# Patient Record
Sex: FEMALE | Race: WHITE | NOT HISPANIC OR LATINO | URBAN - METROPOLITAN AREA
[De-identification: names, ages, dates, MRNs, and addresses within clinical notes are randomized per-mention and may not be internally consistent; named-entity substitution may affect disease eponyms.]

---

## 2021-04-25 ENCOUNTER — EMERGENCY (EMERGENCY)
Facility: HOSPITAL | Age: 56
LOS: 1 days | Discharge: HOME | End: 2021-04-25
Attending: STUDENT IN AN ORGANIZED HEALTH CARE EDUCATION/TRAINING PROGRAM
Payer: COMMERCIAL

## 2021-04-25 VITALS
HEART RATE: 80 BPM | RESPIRATION RATE: 16 BRPM | TEMPERATURE: 98 F | SYSTOLIC BLOOD PRESSURE: 130 MMHG | OXYGEN SATURATION: 100 % | DIASTOLIC BLOOD PRESSURE: 79 MMHG

## 2021-04-25 VITALS
RESPIRATION RATE: 18 BRPM | HEIGHT: 65 IN | HEART RATE: 91 BPM | TEMPERATURE: 98 F | SYSTOLIC BLOOD PRESSURE: 115 MMHG | OXYGEN SATURATION: 97 % | DIASTOLIC BLOOD PRESSURE: 74 MMHG | WEIGHT: 139.99 LBS

## 2021-04-25 LAB
ALBUMIN SERPL ELPH-MCNC: 3.9 G/DL — SIGNIFICANT CHANGE UP (ref 3.3–5)
ALP SERPL-CCNC: 55 U/L — SIGNIFICANT CHANGE UP (ref 40–120)
ALT FLD-CCNC: 32 U/L — SIGNIFICANT CHANGE UP (ref 10–45)
ANION GAP SERPL CALC-SCNC: 14 MMOL/L — SIGNIFICANT CHANGE UP (ref 5–17)
AST SERPL-CCNC: 22 U/L — SIGNIFICANT CHANGE UP (ref 10–40)
BASOPHILS # BLD AUTO: 0.05 K/UL — SIGNIFICANT CHANGE UP (ref 0–0.2)
BASOPHILS NFR BLD AUTO: 0.8 % — SIGNIFICANT CHANGE UP (ref 0–2)
BILIRUB SERPL-MCNC: 0.4 MG/DL — SIGNIFICANT CHANGE UP (ref 0.2–1.2)
BUN SERPL-MCNC: 14 MG/DL — SIGNIFICANT CHANGE UP (ref 7–23)
CALCIUM SERPL-MCNC: 9.3 MG/DL — SIGNIFICANT CHANGE UP (ref 8.4–10.5)
CHLORIDE SERPL-SCNC: 107 MMOL/L — SIGNIFICANT CHANGE UP (ref 96–108)
CO2 SERPL-SCNC: 21 MMOL/L — LOW (ref 22–31)
CREAT SERPL-MCNC: 0.52 MG/DL — SIGNIFICANT CHANGE UP (ref 0.5–1.3)
D DIMER BLD IA.RAPID-MCNC: 161 NG/ML DDU — SIGNIFICANT CHANGE UP
EOSINOPHIL # BLD AUTO: 0.08 K/UL — SIGNIFICANT CHANGE UP (ref 0–0.5)
EOSINOPHIL NFR BLD AUTO: 1.2 % — SIGNIFICANT CHANGE UP (ref 0–6)
GLUCOSE SERPL-MCNC: 94 MG/DL — SIGNIFICANT CHANGE UP (ref 70–99)
HCT VFR BLD CALC: 42.4 % — SIGNIFICANT CHANGE UP (ref 34.5–45)
HGB BLD-MCNC: 14.4 G/DL — SIGNIFICANT CHANGE UP (ref 11.5–15.5)
IMM GRANULOCYTES NFR BLD AUTO: 0.5 % — SIGNIFICANT CHANGE UP (ref 0–1.5)
LYMPHOCYTES # BLD AUTO: 1.67 K/UL — SIGNIFICANT CHANGE UP (ref 1–3.3)
LYMPHOCYTES # BLD AUTO: 25.7 % — SIGNIFICANT CHANGE UP (ref 13–44)
MCHC RBC-ENTMCNC: 30.4 PG — SIGNIFICANT CHANGE UP (ref 27–34)
MCHC RBC-ENTMCNC: 34 GM/DL — SIGNIFICANT CHANGE UP (ref 32–36)
MCV RBC AUTO: 89.5 FL — SIGNIFICANT CHANGE UP (ref 80–100)
MONOCYTES # BLD AUTO: 0.68 K/UL — SIGNIFICANT CHANGE UP (ref 0–0.9)
MONOCYTES NFR BLD AUTO: 10.4 % — SIGNIFICANT CHANGE UP (ref 2–14)
NEUTROPHILS # BLD AUTO: 4 K/UL — SIGNIFICANT CHANGE UP (ref 1.8–7.4)
NEUTROPHILS NFR BLD AUTO: 61.4 % — SIGNIFICANT CHANGE UP (ref 43–77)
NRBC # BLD: 0 /100 WBCS — SIGNIFICANT CHANGE UP (ref 0–0)
NT-PROBNP SERPL-SCNC: 60 PG/ML — SIGNIFICANT CHANGE UP (ref 0–300)
PLATELET # BLD AUTO: 199 K/UL — SIGNIFICANT CHANGE UP (ref 150–400)
POTASSIUM SERPL-MCNC: 3.9 MMOL/L — SIGNIFICANT CHANGE UP (ref 3.5–5.3)
POTASSIUM SERPL-SCNC: 3.9 MMOL/L — SIGNIFICANT CHANGE UP (ref 3.5–5.3)
PROT SERPL-MCNC: 7 G/DL — SIGNIFICANT CHANGE UP (ref 6–8.3)
RBC # BLD: 4.74 M/UL — SIGNIFICANT CHANGE UP (ref 3.8–5.2)
RBC # FLD: 12.1 % — SIGNIFICANT CHANGE UP (ref 10.3–14.5)
SARS-COV-2 RNA SPEC QL NAA+PROBE: SIGNIFICANT CHANGE UP
SODIUM SERPL-SCNC: 142 MMOL/L — SIGNIFICANT CHANGE UP (ref 135–145)
TROPONIN T, HIGH SENSITIVITY RESULT: <6 NG/L — SIGNIFICANT CHANGE UP (ref 0–51)
WBC # BLD: 6.51 K/UL — SIGNIFICANT CHANGE UP (ref 3.8–10.5)
WBC # FLD AUTO: 6.51 K/UL — SIGNIFICANT CHANGE UP (ref 3.8–10.5)

## 2021-04-25 PROCEDURE — U0003: CPT

## 2021-04-25 PROCEDURE — 99284 EMERGENCY DEPT VISIT MOD MDM: CPT | Mod: 25

## 2021-04-25 PROCEDURE — 85025 COMPLETE CBC W/AUTO DIFF WBC: CPT

## 2021-04-25 PROCEDURE — 71045 X-RAY EXAM CHEST 1 VIEW: CPT | Mod: 26

## 2021-04-25 PROCEDURE — 85379 FIBRIN DEGRADATION QUANT: CPT

## 2021-04-25 PROCEDURE — 93010 ELECTROCARDIOGRAM REPORT: CPT | Mod: NC

## 2021-04-25 PROCEDURE — 93005 ELECTROCARDIOGRAM TRACING: CPT

## 2021-04-25 PROCEDURE — 99284 EMERGENCY DEPT VISIT MOD MDM: CPT

## 2021-04-25 PROCEDURE — 80053 COMPREHEN METABOLIC PANEL: CPT

## 2021-04-25 PROCEDURE — U0005: CPT

## 2021-04-25 PROCEDURE — 71045 X-RAY EXAM CHEST 1 VIEW: CPT

## 2021-04-25 PROCEDURE — 84484 ASSAY OF TROPONIN QUANT: CPT

## 2021-04-25 PROCEDURE — 83880 ASSAY OF NATRIURETIC PEPTIDE: CPT

## 2021-04-25 PROCEDURE — 96374 THER/PROPH/DIAG INJ IV PUSH: CPT

## 2021-04-25 RX ORDER — ACETAMINOPHEN 500 MG
975 TABLET ORAL ONCE
Refills: 0 | Status: COMPLETED | OUTPATIENT
Start: 2021-04-25 | End: 2021-04-25

## 2021-04-25 RX ORDER — METOCLOPRAMIDE HCL 10 MG
10 TABLET ORAL ONCE
Refills: 0 | Status: COMPLETED | OUTPATIENT
Start: 2021-04-25 | End: 2021-04-25

## 2021-04-25 RX ORDER — SODIUM CHLORIDE 9 MG/ML
1000 INJECTION INTRAMUSCULAR; INTRAVENOUS; SUBCUTANEOUS ONCE
Refills: 0 | Status: COMPLETED | OUTPATIENT
Start: 2021-04-25 | End: 2021-04-25

## 2021-04-25 RX ADMIN — Medication 10 MILLIGRAM(S): at 12:51

## 2021-04-25 RX ADMIN — SODIUM CHLORIDE 1000 MILLILITER(S): 9 INJECTION INTRAMUSCULAR; INTRAVENOUS; SUBCUTANEOUS at 12:51

## 2021-04-25 RX ADMIN — Medication 975 MILLIGRAM(S): at 12:51

## 2021-04-25 NOTE — ED PROVIDER NOTE - PATIENT PORTAL LINK FT
You can access the FollowMyHealth Patient Portal offered by [facility] by registering at the following website: [email or web address]. By joining [facility]’s FollowMyHealth portal, you will also be able to view your health information using other applications (apps) compatible with our system.

## 2021-04-25 NOTE — ED ADULT NURSE NOTE - OBJECTIVE STATEMENT
55 year old female presents to the ED complaining of pressure headache since receiving her 2nd dose of the pizer covid vaccine on Thursday. Pt has some photophobia and is otherwise neurologically intact, Pt states that "it feels like I'm waking on a cruise ship" pt is ambulatory and steady on her feet while in ED> Pt is A&O x 4, VSS, afebrile, ambulating independently. Pt denies fever, chills, NVD, SOB, or chest pain. IV placed, labs drawn, bed in low position, safety measures in place. Pt has call bell within reach., 20G IV placed in right lateral forearm.

## 2021-04-25 NOTE — ED PROVIDER NOTE - NSFOLLOWUPINSTRUCTIONS_ED_ALL_ED_FT
Hydrate.    Please see the information of COVID vaccine and headache.    Keep continue Tylenol for pain as package directed and respect warnings on the label.    Follow up with your primary Dr. for reevaluation, call Monday for an appointment in 2-3days.    Return for any concerns or worsening symptoms.

## 2021-04-25 NOTE — ED PROVIDER NOTE - PHYSICAL EXAMINATION
NAD, VSS, Afebrile, + PERRL, EOMI, No sinus tender. No spinal tender. Lungs clear. ABD soft, non tender. No CVA tender. No peripheral edema or calf tender. Neuro- intact.

## 2021-04-25 NOTE — ED PROVIDER NOTE - OBJECTIVE STATEMENT
54yo female pt, no PMHx, non smoker, presents to ED with headache/dizziness, nausea, neck pain, SOB and left calf pain. Pt stated she had 2nd Pfizer COVID vaccine on 4/21/2021 and all symptoms started next day, 4/22/2021. Reported her head is pressure pain on posterior area with neck pain with dizziness and now the pain radiating to front. She's also had intermittent nausea, SOB, mild cough and left calf pain. She took 2 tablets of Tylenol with some relief but noticed worsening headache this morning. Denies injury. Denies previous headache. Denies fever, chills, sore throat, or congestion. Denies visual changes. Denies vomiting. Denies CP/back pain or ABD pain. Denies diarrhea. Denies urinary problems. Denies sensory changes or weakness to extremities. 54yo female pt, no PMHx, non smoker, presents to ED with headache/dizziness, nausea, neck pain, SOB and left calf pain. Pt stated she had 2nd Pfizer COVID vaccine on 4/21/2021 and all symptoms started next day, 4/22/2021. Reported her head is pressure pain on posterior area with neck pain with dizziness and now the pain radiating to front. She's also had intermittent nausea, SOB, mild cough and left calf pain and fever (101) on Thursday and Friday. She took 2 tablets of Tylenol with some relief but noticed worsening headache this morning. Denies injury. Denies previous headache. Denies sore throat, or congestion. Denies visual changes. Denies vomiting. Denies CP/back pain or ABD pain. Denies diarrhea. Denies urinary problems. Denies sensory changes or weakness to extremities.

## 2021-04-25 NOTE — ED ADULT NURSE NOTE - NS_ED_NURSE_TEACHING_TOPIC_ED_A_ED
Pt discharged, VSS, afebrile, ambulating independently. Nurse clearly reviewed discharge instructions, followup care, and when to return to the ED - Pt verbalized understanding./Neurovascular

## 2021-04-25 NOTE — ED PROVIDER NOTE - CLINICAL SUMMARY MEDICAL DECISION MAKING FREE TEXT BOX
Dr. Zulema Perez: 55 year old female with no significant past medial history presented to ED with headache, fever, and diffuse myalgia after 2nd Pfizer vaccine. No N/V. No neurological deficits on exam. No nuchal rigidity. Symptoms likely related to recent COVID vaccine. Plan: Labs, analgesia PRN. Reasess

## 2023-06-30 ENCOUNTER — EMERGENCY (EMERGENCY)
Facility: HOSPITAL | Age: 58
LOS: 1 days | Discharge: HOME | End: 2023-06-30
Attending: EMERGENCY MEDICINE | Admitting: EMERGENCY MEDICINE
Payer: COMMERCIAL

## 2023-06-30 VITALS
HEART RATE: 85 BPM | RESPIRATION RATE: 18 BRPM | OXYGEN SATURATION: 98 % | TEMPERATURE: 98 F | DIASTOLIC BLOOD PRESSURE: 84 MMHG | SYSTOLIC BLOOD PRESSURE: 117 MMHG

## 2023-06-30 VITALS
TEMPERATURE: 98 F | DIASTOLIC BLOOD PRESSURE: 80 MMHG | OXYGEN SATURATION: 97 % | RESPIRATION RATE: 18 BRPM | SYSTOLIC BLOOD PRESSURE: 122 MMHG | WEIGHT: 154.1 LBS | HEART RATE: 93 BPM | HEIGHT: 65 IN

## 2023-06-30 PROBLEM — Z00.00 ENCOUNTER FOR PREVENTIVE HEALTH EXAMINATION: Status: ACTIVE | Noted: 2023-06-30

## 2023-06-30 LAB
ALBUMIN SERPL ELPH-MCNC: 3.6 G/DL — SIGNIFICANT CHANGE UP (ref 3.3–5)
ALP SERPL-CCNC: 57 U/L — SIGNIFICANT CHANGE UP (ref 40–120)
ALT FLD-CCNC: 31 U/L — SIGNIFICANT CHANGE UP (ref 12–78)
ANION GAP SERPL CALC-SCNC: 6 MMOL/L — SIGNIFICANT CHANGE UP (ref 5–17)
APPEARANCE UR: ABNORMAL
AST SERPL-CCNC: 16 U/L — SIGNIFICANT CHANGE UP (ref 15–37)
BASOPHILS # BLD AUTO: 0.05 K/UL — SIGNIFICANT CHANGE UP (ref 0–0.2)
BASOPHILS NFR BLD AUTO: 0.5 % — SIGNIFICANT CHANGE UP (ref 0–2)
BILIRUB SERPL-MCNC: 0.6 MG/DL — SIGNIFICANT CHANGE UP (ref 0.2–1.2)
BILIRUB UR-MCNC: NEGATIVE — SIGNIFICANT CHANGE UP
BUN SERPL-MCNC: 12 MG/DL — SIGNIFICANT CHANGE UP (ref 7–23)
CALCIUM SERPL-MCNC: 9.4 MG/DL — SIGNIFICANT CHANGE UP (ref 8.5–10.1)
CHLORIDE SERPL-SCNC: 111 MMOL/L — HIGH (ref 96–108)
CO2 SERPL-SCNC: 26 MMOL/L — SIGNIFICANT CHANGE UP (ref 22–31)
COLOR SPEC: YELLOW — SIGNIFICANT CHANGE UP
CREAT SERPL-MCNC: 0.56 MG/DL — SIGNIFICANT CHANGE UP (ref 0.5–1.3)
DIFF PNL FLD: ABNORMAL
EGFR: 105 ML/MIN/1.73M2 — SIGNIFICANT CHANGE UP
EOSINOPHIL # BLD AUTO: 0.06 K/UL — SIGNIFICANT CHANGE UP (ref 0–0.5)
EOSINOPHIL NFR BLD AUTO: 0.6 % — SIGNIFICANT CHANGE UP (ref 0–6)
GLUCOSE SERPL-MCNC: 100 MG/DL — HIGH (ref 70–99)
GLUCOSE UR QL: NEGATIVE MG/DL — SIGNIFICANT CHANGE UP
HCT VFR BLD CALC: 42.2 % — SIGNIFICANT CHANGE UP (ref 34.5–45)
HGB BLD-MCNC: 14.4 G/DL — SIGNIFICANT CHANGE UP (ref 11.5–15.5)
IMM GRANULOCYTES NFR BLD AUTO: 0.5 % — SIGNIFICANT CHANGE UP (ref 0–0.9)
KETONES UR-MCNC: NEGATIVE MG/DL — SIGNIFICANT CHANGE UP
LEUKOCYTE ESTERASE UR-ACNC: ABNORMAL
LYMPHOCYTES # BLD AUTO: 1.64 K/UL — SIGNIFICANT CHANGE UP (ref 1–3.3)
LYMPHOCYTES # BLD AUTO: 15.8 % — SIGNIFICANT CHANGE UP (ref 13–44)
MCHC RBC-ENTMCNC: 31 PG — SIGNIFICANT CHANGE UP (ref 27–34)
MCHC RBC-ENTMCNC: 34.1 GM/DL — SIGNIFICANT CHANGE UP (ref 32–36)
MCV RBC AUTO: 90.8 FL — SIGNIFICANT CHANGE UP (ref 80–100)
MONOCYTES # BLD AUTO: 0.74 K/UL — SIGNIFICANT CHANGE UP (ref 0–0.9)
MONOCYTES NFR BLD AUTO: 7.1 % — SIGNIFICANT CHANGE UP (ref 2–14)
NEUTROPHILS # BLD AUTO: 7.83 K/UL — HIGH (ref 1.8–7.4)
NEUTROPHILS NFR BLD AUTO: 75.5 % — SIGNIFICANT CHANGE UP (ref 43–77)
NITRITE UR-MCNC: POSITIVE
NRBC # BLD: 0 /100 WBCS — SIGNIFICANT CHANGE UP (ref 0–0)
PH UR: 6 — SIGNIFICANT CHANGE UP (ref 5–8)
PLATELET # BLD AUTO: 222 K/UL — SIGNIFICANT CHANGE UP (ref 150–400)
POTASSIUM SERPL-MCNC: 3.9 MMOL/L — SIGNIFICANT CHANGE UP (ref 3.5–5.3)
POTASSIUM SERPL-SCNC: 3.9 MMOL/L — SIGNIFICANT CHANGE UP (ref 3.5–5.3)
PROT SERPL-MCNC: 7.2 G/DL — SIGNIFICANT CHANGE UP (ref 6–8.3)
PROT UR-MCNC: 30 MG/DL
RBC # BLD: 4.65 M/UL — SIGNIFICANT CHANGE UP (ref 3.8–5.2)
RBC # FLD: 12.5 % — SIGNIFICANT CHANGE UP (ref 10.3–14.5)
SODIUM SERPL-SCNC: 143 MMOL/L — SIGNIFICANT CHANGE UP (ref 135–145)
SP GR SPEC: 1.01 — SIGNIFICANT CHANGE UP (ref 1–1.03)
UROBILINOGEN FLD QL: 1 MG/DL — SIGNIFICANT CHANGE UP (ref 0.2–1)
WBC # BLD: 10.37 K/UL — SIGNIFICANT CHANGE UP (ref 3.8–10.5)
WBC # FLD AUTO: 10.37 K/UL — SIGNIFICANT CHANGE UP (ref 3.8–10.5)

## 2023-06-30 PROCEDURE — 85025 COMPLETE CBC W/AUTO DIFF WBC: CPT

## 2023-06-30 PROCEDURE — 99284 EMERGENCY DEPT VISIT MOD MDM: CPT

## 2023-06-30 PROCEDURE — 36415 COLL VENOUS BLD VENIPUNCTURE: CPT

## 2023-06-30 PROCEDURE — 99284 EMERGENCY DEPT VISIT MOD MDM: CPT | Mod: 25

## 2023-06-30 PROCEDURE — 74176 CT ABD & PELVIS W/O CONTRAST: CPT | Mod: MA

## 2023-06-30 PROCEDURE — 87086 URINE CULTURE/COLONY COUNT: CPT

## 2023-06-30 PROCEDURE — 74176 CT ABD & PELVIS W/O CONTRAST: CPT | Mod: 26,MA

## 2023-06-30 PROCEDURE — 87186 SC STD MICRODIL/AGAR DIL: CPT

## 2023-06-30 PROCEDURE — 80053 COMPREHEN METABOLIC PANEL: CPT

## 2023-06-30 PROCEDURE — 81001 URINALYSIS AUTO W/SCOPE: CPT

## 2023-06-30 PROCEDURE — 96374 THER/PROPH/DIAG INJ IV PUSH: CPT

## 2023-06-30 RX ORDER — CEFUROXIME AXETIL 250 MG
1 TABLET ORAL
Qty: 14 | Refills: 0
Start: 2023-06-30 | End: 2023-07-06

## 2023-06-30 RX ORDER — ACETAMINOPHEN 500 MG
650 TABLET ORAL ONCE
Refills: 0 | Status: COMPLETED | OUTPATIENT
Start: 2023-06-30 | End: 2023-06-30

## 2023-06-30 RX ORDER — CEFTRIAXONE 500 MG/1
1000 INJECTION, POWDER, FOR SOLUTION INTRAMUSCULAR; INTRAVENOUS ONCE
Refills: 0 | Status: COMPLETED | OUTPATIENT
Start: 2023-06-30 | End: 2023-06-30

## 2023-06-30 RX ORDER — PHENAZOPYRIDINE HCL 100 MG
100 TABLET ORAL ONCE
Refills: 0 | Status: COMPLETED | OUTPATIENT
Start: 2023-06-30 | End: 2023-06-30

## 2023-06-30 RX ORDER — PHENAZOPYRIDINE HCL 100 MG
2 TABLET ORAL
Qty: 12 | Refills: 0
Start: 2023-06-30 | End: 2023-07-01

## 2023-06-30 RX ADMIN — Medication 650 MILLIGRAM(S): at 15:49

## 2023-06-30 RX ADMIN — Medication 100 MILLIGRAM(S): at 15:50

## 2023-06-30 RX ADMIN — CEFTRIAXONE 100 MILLIGRAM(S): 500 INJECTION, POWDER, FOR SOLUTION INTRAMUSCULAR; INTRAVENOUS at 15:50

## 2023-06-30 NOTE — ED PROVIDER NOTE - CLINICAL SUMMARY MEDICAL DECISION MAKING FREE TEXT BOX
Patient referred by her gynecologist for hematuria today.  Patient relates when she woke up this morning, blood in her underwear, and when she went to urinate she noticed afterwards that the bowl was bloody.  Patient denies rectal bleeding.  Patient went to her gynecologist who did not a speculum exam and said that the bleeding was not from her vagina, was urinary.  Patient relates chronic low back pain across her lower back, unchanged at this time.  Patient denies fevers chills abdominal pain flank pain nausea vomiting.  Patient does report some dysuria.  Patient declining pain medication    Plan Labs CT [place] IV fluid    Differential including but not limited to kidney stone UTI

## 2023-06-30 NOTE — ED PROVIDER NOTE - PATIENT PORTAL LINK FT
You can access the FollowMyHealth Patient Portal offered by [facility] by registering at the following website: [email or web address]. By joining [facility]’s FollowMyHealth portal, you will also be able to view your health information using other applications (apps) compatible with our system.

## 2023-06-30 NOTE — ED ADULT NURSE NOTE - OBJECTIVE STATEMENT
pt is A&Ox4, pt presented to the ER from home  for vaginal bleed, denies any dysuria, hematuria+, denies any clots. Iv placed, meds given as per Md orders, resp even and unlabored, will continue to monitor

## 2023-06-30 NOTE — ED ADULT NURSE NOTE - NSFALLUNIVINTERV_ED_ALL_ED
Bed/Stretcher in lowest position, wheels locked, appropriate side rails in place/Call bell, personal items and telephone in reach/Instruct patient to call for assistance before getting out of bed/chair/stretcher/Non-slip footwear applied when patient is off stretcher/[place] to call system/Physically safe environment - no spills, clutter or unnecessary equipment/Purposeful proactive rounding/Room/bathroom lighting operational, light cord in reach

## 2023-06-30 NOTE — ED PROVIDER NOTE - NSFOLLOWUPINSTRUCTIONS_ED_ALL_ED_FT
Follow up with pcp  drink plenty of water  return to er for any worsening symptoms     Hemorrhagic Cystitis  Hemorrhagic cystitis is bleeding from damage to the inner lining of the bladder. This condition results when the inner lining of the bladder (transitional epithelium) is damaged along with the blood vessels that supply the area. Hemorrhagic cystitis may make it difficult or painful to pass urine.    What are the causes?  This condition may be caused by:  Damage from certain cancer treatments. This is the most common cause. It can result from:  Radiation treatment that involves the bladder.  Chemotherapy drugs used to treat certain cancers or to treat people who have a bone marrow transplant (cyclophosphamide and ifosfamide).  Infections with bacteria or viruses, especially in people with a weak body defense system (immune system).  Rare causes of the condition include:  Other drugs, including penicillin drugs and a type of steroid (danazol).  Exposure to toxic chemicals used in dyes, markers, shoe polish, or pesticides.  What are the signs or symptoms?  The main sign of this condition is blood in the urine (hematuria). This can range from very mild to severe.  Mild hematuria can include microscopic bleeding that does not change the color of your urine.  Severe hematuria can cause you to have urine with bright red blood or blood clots in it. In some cases, severe hematuria can cause large clots that fill the bladder and block urine flow (urinary obstruction).  Hemorrhagic cystitis may also cause symptoms such as:  An urgent or frequent need to pass urine.  Pain when passing urine.  Lower belly pain and fullness.  Urinary obstruction.  How is this diagnosed?  This condition may be diagnosed based on:  Your symptoms and medical history.  A physical exam.  Tests, such as:  Urine tests to check for blood or signs of infection.  Blood tests to check for signs of infection and a low red blood cell count due to bleeding.  Imaging tests of the bladder, such as ultrasound, CT scan, or MRI.  A procedure to examine the inside of your bladder using a flexible scope (cystoscopy).  How is this treated?  Treatment depends on the cause of the condition and how severe the bleeding is. If you are being treated with chemotherapy drugs, you may be given other medicines to reduce the risk for this condition during treatment. Other treatments may include:  Removing your exposure to substances that are causing the condition, such as a chemical toxin or a medicine.  Taking antibiotic or antiviral medicine if the condition is caused by an infection.  You may also be treated for hematuria. This can include:  Fluids (hydration), bed rest, and observation. These methods may be all that is needed if you are able to pass urine and have no blood clots.  Placing a flexible tube (catheter) into the bladder to continuously flush out (irrigate) the bladder with sterile saline solution. This may be needed if clots are passing or if bleeding is continuing.  Medicine to reduce bleeding.  A cystoscopy to remove clots if clots are filling the bladder. This may also include a procedure to stop bleeding (coagulation).  A transfusion to replace blood loss.  You may need surgery to stop bleeding or to remove the bladder if other treatments have not helped.    Follow these instructions at home:  A comparison of three sample cups showing dark yellow, yellow, and pale yellow urine.  If you had surgery, your health care provider will give you instructions for taking care of yourself at home after your procedure. Follow these instructions carefully.  Take over-the-counter and prescription medicines only as told by your health care provider.  If you were prescribed an antibiotic medicine, take it as told by your health care provider. Do not stop using the antibiotic even if you start to feel better.  Return to your normal activities as told by your health care provider. Ask your health care provider what activities are safe for you.  Drink enough fluid to keep your urine pale yellow.  Keep all follow-up visits as told by your health care provider. This is important.  Contact a health care provider if you have:  Chills or a fever.  Blood in your urine.  An urgent or frequent need to pass urine.  Pain when passing urine.  Get help right away if you:  Have bright red blood or clots in your urine.  Are unable to pass urine.  Summary  Hemorrhagic cystitis is bleeding caused by damage to the inner lining of your bladder.  Hemorrhagic cystitis may make it difficult or painful to pass urine.  This condition may be caused by damage from infections, radiation therapy, or chemotherapy drugs.  Blood in the urine (hematuria) is the main sign of hemorrhagic cystitis. Hematuria can be very mild and involve microscopic bleeding that does not change the color of urine. It can also be severe and include passing urine with bright red blood or blood clots in it.  Treatment for hemorrhagic cystitis depends on the cause and severity of the condition. In most cases, the condition will clear up with supportive care that may include rest, fluids, and antibiotics, along with removing exposure to the cause. Other treatments may be needed in more serious cases.  This information is not intended to replace advice given to you by your health care provider. Make sure you discuss any questions you have with your health care provider.

## 2023-06-30 NOTE — ED PROVIDER NOTE - NS ED ATTENDING STATEMENT MOD
This was a shared visit with the CARLOS EDUARDO. I reviewed and verified the documentation and independently performed the documented:

## 2023-06-30 NOTE — ED PROVIDER NOTE - OBJECTIVE STATEMENT
Patient is a 57-year-old female with past medical history of UroLift and partial hysterectomy coming in for hematuria.  Patient states she woke up today noticed blood in her underwear and then noticed it when she went to urinate.  Patient went to her GYN had pelvic exam advised not GYN related advised to come and go to emergency room for further checkup.  Patient does admit to some increased frequency and urgency denies any fever chills abdominal pain flank pain nausea vomiting diarrhea.  Patient is on aspirin daily.  Patient with has chronic low back pain across her lower back.

## 2023-06-30 NOTE — ED ADULT TRIAGE NOTE - CHIEF COMPLAINT QUOTE
Patient walked in with c/o vaginal bleeding since this morning, referred to ER by OBGYN to see a urologist. Patient reports she sees urologist for bladder lift last year but they were closed today.

## 2023-06-30 NOTE — ED ADULT NURSE NOTE - NSFALLLASTSIX_ED_ALL_ED
Refill requested:   Requested Prescriptions     Pending Prescriptions Disp Refills   • SYMBICORT 160-4.5 MCG/ACT Inhalation Aerosol [Pharmacy Med Name: Symbicort 160-4.5 Mcg/Act Aer Astr]  0     Sig: INHALE TWO PUFFS BY MOUTH TWICE DAILY   • Ondansetron HC GI)    Please arrive 15 minutes prior to your scheduled appointment time.      May 14, 2019  3:45 PM CDT FOLLOW UP with Marilee Figueroa  [facility] ([facility])        [address] [place] [address] Unable to determine.

## 2023-07-03 ENCOUNTER — TRANSCRIPTION ENCOUNTER (OUTPATIENT)
Age: 58
End: 2023-07-03

## 2023-07-03 ENCOUNTER — APPOINTMENT (OUTPATIENT)
Dept: PULMONOLOGY | Facility: CLINIC | Age: 58
End: 2023-07-03
Payer: COMMERCIAL

## 2023-07-03 VITALS
RESPIRATION RATE: 20 BRPM | DIASTOLIC BLOOD PRESSURE: 69 MMHG | HEIGHT: 64 IN | WEIGHT: 154 LBS | HEART RATE: 90 BPM | SYSTOLIC BLOOD PRESSURE: 116 MMHG | OXYGEN SATURATION: 97 % | BODY MASS INDEX: 26.29 KG/M2

## 2023-07-03 DIAGNOSIS — R09.02 HYPOXEMIA: ICD-10-CM

## 2023-07-03 DIAGNOSIS — Z78.9 OTHER SPECIFIED HEALTH STATUS: ICD-10-CM

## 2023-07-03 DIAGNOSIS — R05.9 COUGH, UNSPECIFIED: ICD-10-CM

## 2023-07-03 DIAGNOSIS — Z86.39 PERSONAL HISTORY OF OTHER ENDOCRINE, NUTRITIONAL AND METABOLIC DISEASE: ICD-10-CM

## 2023-07-03 DIAGNOSIS — Z80.8 FAMILY HISTORY OF MALIGNANT NEOPLASM OF OTHER ORGANS OR SYSTEMS: ICD-10-CM

## 2023-07-03 DIAGNOSIS — Z82.5 FAMILY HISTORY OF ASTHMA AND OTHER CHRONIC LOWER RESPIRATORY DISEASES: ICD-10-CM

## 2023-07-03 DIAGNOSIS — Z83.3 FAMILY HISTORY OF DIABETES MELLITUS: ICD-10-CM

## 2023-07-03 DIAGNOSIS — U07.1 COVID-19: ICD-10-CM

## 2023-07-03 DIAGNOSIS — R06.09 OTHER FORMS OF DYSPNEA: ICD-10-CM

## 2023-07-03 DIAGNOSIS — Z82.49 FAMILY HISTORY OF ISCHEMIC HEART DISEASE AND OTHER DISEASES OF THE CIRCULATORY SYSTEM: ICD-10-CM

## 2023-07-03 PROCEDURE — 99203 OFFICE O/P NEW LOW 30 MIN: CPT

## 2023-07-03 RX ORDER — ATORVASTATIN CALCIUM 20 MG/1
20 TABLET, FILM COATED ORAL
Qty: 90 | Refills: 0 | Status: COMPLETED | COMMUNITY
Start: 2023-04-20

## 2023-07-03 RX ORDER — ASPIRIN 81 MG/1
81 TABLET, COATED ORAL
Qty: 90 | Refills: 0 | Status: ACTIVE | COMMUNITY
Start: 2023-04-20

## 2023-07-03 RX ORDER — ALBUTEROL SULFATE 90 UG/1
108 (90 BASE) INHALANT RESPIRATORY (INHALATION)
Qty: 8 | Refills: 0 | Status: ACTIVE | COMMUNITY
Start: 2023-06-28

## 2023-07-03 RX ORDER — ATORVASTATIN CALCIUM 40 MG/1
40 TABLET, FILM COATED ORAL
Qty: 90 | Refills: 0 | Status: ACTIVE | COMMUNITY
Start: 2023-06-15

## 2023-07-03 RX ORDER — PHENAZOPYRIDINE HYDROCHLORIDE 100 MG/1
100 TABLET ORAL
Qty: 12 | Refills: 0 | Status: ACTIVE | COMMUNITY
Start: 2023-06-30

## 2023-07-03 RX ORDER — CEFUROXIME AXETIL 500 MG/1
500 TABLET ORAL
Qty: 14 | Refills: 0 | Status: COMPLETED | COMMUNITY
Start: 2023-06-30

## 2023-07-03 RX ORDER — CIPROFLOXACIN HYDROCHLORIDE 500 MG/1
500 TABLET, FILM COATED ORAL
Qty: 10 | Refills: 0 | Status: ACTIVE | COMMUNITY
Start: 2023-06-30

## 2023-07-03 NOTE — DISCUSSION/SUMMARY
[FreeTextEntry1] : 57-year-old female with history of cough and dyspnea on exertion.  Patient ambulated in the office and revealed significant oxygen desaturation after 100 meters.  (96% to 90%).  PFT with diffusion will be performed as well as a high-resolution CAT scan of the chest to rule out interstitial lung disease.  Had a very long discussion with the patient and her daughter who was present.  Both appear to understand.  She is to follow-up with her PMD as before.

## 2023-07-03 NOTE — HISTORY OF PRESENT ILLNESS
[Never] : never [TextBox_4] : 57-year-old female presents for evaluation of dyspnea on exertion associated with primarily dry cough for 6 months.  Patient states that the problem occurred after COVID-19 infection for which she was treated at home symptomatically.  The patient complains of occasional joint pains and morning stiffness but denies any fever, chills, chest pain, hemoptysis, rash or weight loss.  She had cardiac catheterization a month ago and told everything was "okay".  Subsequently she was seen by a pulmonologist who told her that there was "restriction" on breathing test and recommended a CAT scan of the chest. [TextBox_29] : Denies snoring, daytime somnolence, apneic episodes, AM headaches

## 2023-07-05 ENCOUNTER — APPOINTMENT (OUTPATIENT)
Dept: PULMONOLOGY | Facility: CLINIC | Age: 58
End: 2023-07-05
Payer: COMMERCIAL

## 2023-07-05 PROCEDURE — 94060 EVALUATION OF WHEEZING: CPT

## 2023-07-05 PROCEDURE — 94727 GAS DIL/WSHOT DETER LNG VOL: CPT

## 2023-07-05 PROCEDURE — 94729 DIFFUSING CAPACITY: CPT

## 2023-07-07 RX ORDER — NITROFURANTOIN MACROCRYSTAL 50 MG
1 CAPSULE ORAL
Qty: 14 | Refills: 0
Start: 2023-07-07 | End: 2023-07-13

## 2023-08-21 ENCOUNTER — APPOINTMENT (OUTPATIENT)
Dept: PULMONOLOGY | Facility: CLINIC | Age: 58
End: 2023-08-21
Payer: COMMERCIAL

## 2023-08-21 VITALS
OXYGEN SATURATION: 96 % | WEIGHT: 154 LBS | DIASTOLIC BLOOD PRESSURE: 70 MMHG | HEART RATE: 90 BPM | RESPIRATION RATE: 14 BRPM | HEIGHT: 64 IN | TEMPERATURE: 98 F | SYSTOLIC BLOOD PRESSURE: 110 MMHG | BODY MASS INDEX: 26.29 KG/M2

## 2023-08-21 PROCEDURE — 99214 OFFICE O/P EST MOD 30 MIN: CPT

## 2023-08-21 NOTE — CONSULT LETTER
[Dear  ___] : Dear  [unfilled], [Courtesy Letter:] : I had the pleasure of seeing your patient, [unfilled], in my office today. [Please see my note below.] : Please see my note below. [Consult Closing:] : Thank you very much for allowing me to participate in the care of this patient.  If you have any questions, please do not hesitate to contact me. [Sincerely,] : Sincerely, [FreeTextEntry3] : ZACH WINSLOW MD  [address], [address] [place] [address]

## 2023-08-21 NOTE — DISCUSSION/SUMMARY
[FreeTextEntry1] : 57-year-old female with dyspnea on exertion at baseline.  The patient failed to show any oxygen desaturation with ambulation in the office today.  I reviewed the chest CT images online and discussed the findings with the patient and her  who was present throughout.  The study failed to reveal interstitial lung disease as was expected.  Given the above findings we will most believe that her dyspnea on exertion is primarily related to deconditioning.  Diet, weight loss and exercise were stressed.  She is to use albuterol on an as-needed basis.  GERD treatment was also discussed.  If her symptoms do not improve CPET will be considered.  She is to follow-up with her PMD as before.

## 2023-08-21 NOTE — HISTORY OF PRESENT ILLNESS
[Never] : never [TextBox_4] : 57-year-old female with history of WISEMAN presents for follow-up.  Patient continues to complain of dyspnea on exertion without cough, chest pain or hemoptysis.  Patient has used albuterol on occasion since last visit. Recent cardiac evaluation including cath was negative.  She has occasional GERD complaints. [TextBox_29] : Denies snoring, daytime somnolence, apneic episodes, AM headaches

## 2024-02-21 ENCOUNTER — APPOINTMENT (OUTPATIENT)
Dept: PULMONOLOGY | Facility: CLINIC | Age: 59
End: 2024-02-21

## 2024-02-28 ENCOUNTER — APPOINTMENT (OUTPATIENT)
Dept: PULMONOLOGY | Facility: CLINIC | Age: 59
End: 2024-02-28
Payer: COMMERCIAL

## 2024-02-28 VITALS
RESPIRATION RATE: 16 BRPM | HEART RATE: 80 BPM | DIASTOLIC BLOOD PRESSURE: 78 MMHG | TEMPERATURE: 98 F | OXYGEN SATURATION: 99 % | WEIGHT: 150 LBS | HEIGHT: 64 IN | SYSTOLIC BLOOD PRESSURE: 125 MMHG | BODY MASS INDEX: 25.61 KG/M2

## 2024-02-28 PROCEDURE — 94060 EVALUATION OF WHEEZING: CPT

## 2024-02-28 PROCEDURE — 94727 GAS DIL/WSHOT DETER LNG VOL: CPT

## 2024-02-28 PROCEDURE — 94729 DIFFUSING CAPACITY: CPT

## 2024-02-28 PROCEDURE — 99214 OFFICE O/P EST MOD 30 MIN: CPT | Mod: 25

## 2024-03-03 NOTE — HISTORY OF PRESENT ILLNESS
[Never] : never [TextBox_4] : 58-year-old female with history of WISEMAN presents for follow-up.  Patient continues to have occasional dyspnea on exertion without cough, chest pain, hemoptysis, night sweats or weight loss.  She has occasional GERD like complaints on no treatment.  Last month she experienced significant chest pain for which she was evaluated in the emergency room with negative cardiac workup, told it was due to "stress" which she states was related to multiple family members having medical problems.  Her mother passed away last week.  She uses albuterol on occasion last used a month ago. [TextBox_29] : Denies snoring, daytime somnolence, apneic episodes, AM headaches

## 2024-03-03 NOTE — PHYSICAL EXAM
[Normal Oropharynx] : normal oropharynx [No Acute Distress] : no acute distress [Normal Rate/Rhythm] : normal rate/rhythm [No Neck Mass] : no neck mass [Normal Appearance] : normal appearance [No Murmurs] : no murmurs [Normal S1, S2] : normal s1, s2 [No Resp Distress] : no resp distress [Clear to Auscultation Bilaterally] : clear to auscultation bilaterally [Benign] : benign [No Abnormalities] : no abnormalities [Normal Gait] : normal gait [No Cyanosis] : no cyanosis [No Clubbing] : no clubbing [FROM] : FROM [No Edema] : no edema [Normal Color/ Pigmentation] : normal color/ pigmentation [No Focal Deficits] : no focal deficits [Oriented x3] : oriented x3 [Normal Affect] : normal affect

## 2024-03-03 NOTE — DISCUSSION/SUMMARY
[FreeTextEntry1] : 58-year-old female complaining of occasional dyspnea on exertion with essentially normal pulmonary evaluation.  I reviewed the PFT results with the patient.  She is to use albuterol on an as-needed basis alone for now.  Deconditioning may in part explain her symptoms along with her recent "stress" due to family health matters.  She is to follow-up with her PMD as before.

## 2024-03-03 NOTE — REVIEW OF SYSTEMS
[Cough] : no cough [Sputum] : no sputum [SOB on Exertion] : sob on exertion [GERD] : gerd [Negative] : Psychiatric

## 2024-03-03 NOTE — CONSULT LETTER
[Dear  ___] : Dear  [unfilled], [Courtesy Letter:] : I had the pleasure of seeing your patient, [unfilled], in my office today. [Consult Closing:] : Thank you very much for allowing me to participate in the care of this patient.  If you have any questions, please do not hesitate to contact me. [Please see my note below.] : Please see my note below. [Sincerely,] : Sincerely, [FreeTextEntry3] : ZACH WINSLOW MD  [address], [address] [place] [address]

## 2025-03-28 NOTE — ED ADULT NURSE NOTE - CHIEF COMPLAINT QUOTE
Patient walked in with c/o vaginal bleeding since this morning, referred to ER by OBGYN to see a urologist. Patient reports she sees urologist for bladder lift last year but they were closed today. 99